# Patient Record
(demographics unavailable — no encounter records)

---

## 2025-02-05 NOTE — HISTORY OF PRESENT ILLNESS
[FreeTextEntry1] : Pt with gross hematuria. Pt states a week ago he developed Flu symptoms, he went to Urgent care and states he tested positive for Flu and was started on Tamiflu.  He developed GI upset from Theraflu and ended up stopping it at some point and states his fluid intake decreased as well. He woke up in the middle of the night and voided blood on 3 separate occasions.  He presented to [facility] ER due to Hematuria, kidney US done and urine studies.. He denies frequency, urgency, dysuria, weak stream, nocturia, back pain, kidney stones, STD.   Former smoker, quit 15 years ago, smoked x 15-20 yr 1/2 pp He smokes Marijuana

## 2025-02-05 NOTE — PHYSICAL EXAM
[General Appearance - Well Developed] : well developed [General Appearance - Well Nourished] : well nourished [Normal Appearance] : normal appearance [Well Groomed] : well groomed [General Appearance - In No Acute Distress] : no acute distress [Edema] : no peripheral edema [Respiration, Rhythm And Depth] : normal respiratory rhythm and effort [Exaggerated Use Of Accessory Muscles For Inspiration] : no accessory muscle use [Abdomen Soft] : soft [Abdomen Tenderness] : non-tender [Costovertebral Angle Tenderness] : no ~M costovertebral angle tenderness [Normal Station and Gait] : the gait and station were normal for the patient's age [] : no rash [No Focal Deficits] : no focal deficits [Oriented To Time, Place, And Person] : oriented to person, place, and time [Affect] : the affect was normal [Mood] : the mood was normal [de-identified] : anxious

## 2025-02-05 NOTE — ASSESSMENT
[FreeTextEntry1] : Gross hematuria, BPH w/o LUTS  PVR o ml Pt asymptomatic from BPH PSA 2.75 ng/dL 11/2024 Unable to provide urine specimen Lab: U/A with reflex, urine cytology I discussed use of 5 alpha Reductase Inhibitor. Pt declines due to side effects. I dis inform pt that there are surgical options in which he would be interested in if it comes to that. RTO for Cystoscopy due to gross hematuria,  RTO to see me in 4 weeks   Records reviewed:  Renal/bladder US 2/4/25: Prostamegaly with median lobe hypertrophy and intravesicular extension. Creatinine 0.97 CT abd/pelvis 9/28/24 Thickening of distal stomach suggesting gastritis. Follow up Endoscopy Creatinine 0.97 U/A Blood large,

## 2025-02-05 NOTE — REVIEW OF SYSTEMS
[see HPI] : see HPI [Negative] : Heme/Lymph [Feeling Tired] : feeling tired [Recent Weight Loss (___ Lbs)] : recent [unfilled] ~Ulb weight loss [Cough] : cough [Abdominal Pain] : abdominal pain [Muscle Weakness] : muscle weakness